# Patient Record
Sex: MALE | Race: OTHER | Employment: UNEMPLOYED | URBAN - NONMETROPOLITAN AREA
[De-identification: names, ages, dates, MRNs, and addresses within clinical notes are randomized per-mention and may not be internally consistent; named-entity substitution may affect disease eponyms.]

---

## 2017-07-18 ENCOUNTER — OFFICE VISIT (OUTPATIENT)
Dept: OPTOMETRY | Age: 52
End: 2017-07-18
Payer: COMMERCIAL

## 2017-07-18 DIAGNOSIS — H52.4 HYPEROPIA OF BOTH EYES WITH ASTIGMATISM AND PRESBYOPIA: Primary | ICD-10-CM

## 2017-07-18 DIAGNOSIS — H52.03 HYPEROPIA OF BOTH EYES WITH ASTIGMATISM AND PRESBYOPIA: Primary | ICD-10-CM

## 2017-07-18 DIAGNOSIS — H52.203 HYPEROPIA OF BOTH EYES WITH ASTIGMATISM AND PRESBYOPIA: Primary | ICD-10-CM

## 2017-07-18 PROCEDURE — 92004 COMPRE OPH EXAM NEW PT 1/>: CPT | Performed by: OPTOMETRIST

## 2017-07-18 PROCEDURE — 92015 DETERMINE REFRACTIVE STATE: CPT | Performed by: OPTOMETRIST

## 2017-07-18 RX ORDER — LISINOPRIL 10 MG/1
10 TABLET ORAL DAILY
COMMUNITY

## 2017-07-18 ASSESSMENT — TONOMETRY
IOP_METHOD: APPLANATION W FLURESS DROP
OS_IOP_MMHG: 19
OD_IOP_MMHG: 19

## 2017-07-18 ASSESSMENT — REFRACTION_MANIFEST
OD_SPHERE: +1.25
OD_ADD: +2.25
OS_AXIS: 007
OS_SPHERE: +0.75
OS_ADD: +2.25
OD_AXIS: 005
OD_CYLINDER: -0.75
OS_CYLINDER: -1.00

## 2017-07-18 ASSESSMENT — VISUAL ACUITY
OS_SC: 20/30
OD_SC+: -2
OD_SC: 20/25
METHOD: SNELLEN - LINEAR
OD_SC: 20/70 OU
OS_SC+: -2

## 2017-07-18 ASSESSMENT — SLIT LAMP EXAM - LIDS
COMMENTS: NORMAL
COMMENTS: NORMAL

## 2020-03-10 ENCOUNTER — OFFICE VISIT (OUTPATIENT)
Dept: OPTOMETRY | Age: 55
End: 2020-03-10
Payer: COMMERCIAL

## 2020-03-10 PROCEDURE — 92014 COMPRE OPH EXAM EST PT 1/>: CPT | Performed by: OPTOMETRIST

## 2020-03-10 PROCEDURE — 92015 DETERMINE REFRACTIVE STATE: CPT | Performed by: OPTOMETRIST

## 2020-03-10 RX ORDER — FLECAINIDE ACETATE 50 MG/1
50 TABLET ORAL EVERY 12 HOURS
COMMUNITY
Start: 2020-03-05 | End: 2021-03-05

## 2020-03-10 RX ORDER — METOPROLOL TARTRATE 50 MG/1
50 TABLET, FILM COATED ORAL 2 TIMES DAILY
COMMUNITY
Start: 2019-04-02 | End: 2020-04-01

## 2020-03-10 ASSESSMENT — VISUAL ACUITY
METHOD: SNELLEN - LINEAR
OD_SC: 20/30
OS_SC+: -1
OS_SC: 20/30
OD_SC: 20/50 OU
OD_SC+: -1

## 2020-03-10 ASSESSMENT — SLIT LAMP EXAM - LIDS
COMMENTS: NORMAL
COMMENTS: NORMAL

## 2020-03-10 ASSESSMENT — REFRACTION_WEARINGRX
OD_SPHERE: +1.25
OD_CYLINDER: -0.75
OS_ADD: +2.25
OS_AXIS: 007
SPECS_TYPE: BIFOCAL
OD_ADD: +2.25
OD_AXIS: 005
OS_CYLINDER: -1.00
OS_SPHERE: +0.75

## 2020-03-10 ASSESSMENT — REFRACTION_MANIFEST
OD_AXIS: 002
OD_ADD: +2.25
OS_SPHERE: +1.00
OS_AXIS: 007
OS_CYLINDER: -1.25
OD_SPHERE: +1.50
OS_ADD: +2.25
OD_CYLINDER: -1.00

## 2020-03-10 ASSESSMENT — TONOMETRY
IOP_METHOD: NON-CONTACT AIR PUFF
OS_IOP_MMHG: 17
OD_IOP_MMHG: 18

## 2020-03-10 NOTE — PROGRESS NOTES
Luc Grey presents today for   Chief Complaint   Patient presents with    Blurred Vision    Vision Exam   .    HPI     Blurred Vision     In both eyes. Vision is blurred. Context:  distance vision and reading. Comments     Last Vision Exam: 7/18/2017 Aw  Last Ophthalmology Exam: n/a  Last Filled Glasses Rx: 7/18/2017  Insurance: [employer]  Update: Glasses  Distance and reading are getting more blurry  Need new glasses                  Current Outpatient Medications   Medication Sig Dispense Refill    metoprolol tartrate (LOPRESSOR) 50 MG tablet Take 50 mg by mouth 2 times daily      flecainide (TAMBOCOR) 50 MG tablet Take 50 mg by mouth every 12 hours      bisacodyl (DULCOLAX) 5 MG EC tablet Take 5 mg by mouth daily as needed      apixaban (ELIQUIS) 5 MG TABS tablet Take 5 mg by mouth every 12 hours      lisinopril (PRINIVIL;ZESTRIL) 10 MG tablet Take 10 mg by mouth daily       No current facility-administered medications for this visit.           Family History   Problem Relation Age of Onset    Cataracts Mother     Diabetes Father     Glaucoma Neg Hx      Social History     Socioeconomic History    Marital status: Single     Spouse name: None    Number of children: None    Years of education: None    Highest education level: None   Occupational History    None   Social Needs    Financial resource strain: None    Food insecurity     Worry: None     Inability: None    Transportation needs     Medical: None     Non-medical: None   Tobacco Use    Smoking status: Never Smoker    Smokeless tobacco: Never Used   Substance and Sexual Activity    Alcohol use: None    Drug use: None    Sexual activity: None   Lifestyle    Physical activity     Days per week: None     Minutes per session: None    Stress: None   Relationships    Social connections     Talks on phone: None     Gets together: None     Attends [employer] service: None     Active member of club or organization: None Final Rx       Sphere Cylinder Axis Add    Right +1.50 -1.00 002 +2.25    Left +1.00 -1.25 007 +2.25    Type:  Bifocal    Expiration Date:  3/11/2022            1.  Hyperopia of both eyes with astigmatism and presbyopia           Patient Instructions   New glasses recommended      Return in about 2 years (around 3/10/2022) for complete eye exam.